# Patient Record
Sex: MALE | Race: BLACK OR AFRICAN AMERICAN | HISPANIC OR LATINO | Employment: FULL TIME | URBAN - METROPOLITAN AREA
[De-identification: names, ages, dates, MRNs, and addresses within clinical notes are randomized per-mention and may not be internally consistent; named-entity substitution may affect disease eponyms.]

---

## 2018-08-24 ENCOUNTER — HOSPITAL ENCOUNTER (EMERGENCY)
Facility: HOSPITAL | Age: 28
Discharge: HOME | End: 2018-08-24
Attending: EMERGENCY MEDICINE | Admitting: EMERGENCY MEDICINE
Payer: COMMERCIAL

## 2018-08-24 VITALS
RESPIRATION RATE: 17 BRPM | OXYGEN SATURATION: 98 % | TEMPERATURE: 97.6 F | SYSTOLIC BLOOD PRESSURE: 132 MMHG | HEART RATE: 71 BPM | WEIGHT: 202 LBS | DIASTOLIC BLOOD PRESSURE: 74 MMHG

## 2018-08-24 DIAGNOSIS — R00.2 HEART PALPITATIONS: Primary | ICD-10-CM

## 2018-08-24 PROCEDURE — 99284 EMERGENCY DEPT VISIT MOD MDM: CPT

## 2018-08-24 PROCEDURE — 93005 ELECTROCARDIOGRAM TRACING: CPT

## 2018-08-25 LAB
ATRIAL RATE: 65 BPM
P AXIS: 66 DEGREES
PR INTERVAL: 168 MS
QRS AXIS: 55 DEGREES
QRSD INTERVAL: 92 MS
QT INTERVAL: 368 MS
QTC INTERVAL: 382 MS
T WAVE AXIS: 49 DEGREES
VENTRICULAR RATE: 65 BPM

## 2018-08-25 PROCEDURE — 93010 ELECTROCARDIOGRAM REPORT: CPT | Performed by: INTERNAL MEDICINE

## 2018-08-25 NOTE — DISCHARGE INSTRUCTIONS
Palpitaciones cardíacas   LO QUE NECESITA SABER:   Las palpitaciones cardíacas son sensaciones que se perciben argelia aceleraciones, leann, latidos o aleteos del corazón  También podría sentir latidos adicionales, que chambers corazón no late por un corto periodo de tiempo o que se [employer] latidos  Puede percibir estas sensaciones en el pecho, la garganta o el mónica  Pueden presentarse cuando está sentado, de pie o acostado  Las palpitaciones cardíacas pueden causar temor; sin embargo, generalmente no se deben a un problema importante  INSTRUCCIONES SOBRE EL [facility]:   Llame al 911 o pídale a alguien más que llame en cualquiera de los siguientes casos:   · Usted tiene alguno de los siguientes signos de un ataque cardíaco:      ¨ Estornudos, presión, o dolor en chambers pecho que dura mas de 5 minutos o regresa  ¨ Malestar o dolor en chambers espalda, mónica, mandíbula, abdomen, o brazo     ¨ Dificultad para respirar    ¨ Náuseas o vómito    ¨ Siente un desvanecimiento o tiene sudores fríos especialmente en el pecho o dificultad para respirar  · Usted tiene alguno de los siguientes signos de derrame cerebral:      ¨ Adormecimiento o caída de un lado de chambers saira     ¨ Debilidad en un brazo o ricky pierna    ¨ Confusión o debilidad para hablar    ¨ Mareos o dolor de cheryl intenso, o pérdida de la visión  · Usted se desmaya o pierde el conocimiento  Regrese a la xavi de emergencias si:   · Nikolas palpitaciones ocurren con más frecuencia o son más intensas  Pregúntele a chambers Samule Rode vitaminas y minerales son adecuados para usted  · Usted tiene nueva inflamación en nikolas pies o tobillos o esta ha empeorado  · Usted tiene preguntas o inquietudes acerca de chambers condición o cuidado  Acuda a nikolas consultas de control con chambers médico según le indicaron  Es posible que tenga que programar ricky ramonita de seguimiento con un cardiólogo   Hal vez deba hacerse exámenes para determinar si sufre problemas cardíacos que le causan las palpitaciones  Anote nikolas preguntas para que se acuerde de hacerlas twyla nikolas visitas  Mantenga un registro:  Magalys cuándo nikolas palpitaciones comienzan y terminan, qué estaba usted haciendo cuando comenzaron y nikolas síntomas  Mantenga un registro de lo que usted comió o tomó twyla las horas antes de nikolas palpitaciones  Incluya todo lo que aparentemente le ayudó a que nikolas síntomas mejoraran argelia acostarse o contener chambers respiración  Carmencita [employer] ayudará a usted y a chambers médico a saber qué le provoca las palpitaciones  Lleve el registro con usted a nikolas citas de seguimiento  Ayude a evitar las palpitaciones cardíacas:   · Controle el estrés y la ansiedad  Encuentre formas de [place], argelia escuchar música, meditar o hacer yoga  El ejercicio también puede ayudar a disminuir el estrés y la ansiedad  Hable con [place] persona de confianza sobre el estrés o la ansiedad que padece  También puede hablar con un psicoterapeuta  · Duerma lo suficiente cada la noche  Pregunte a chambers médico cuánto debería dormir usted cada noche  · No tome bebidas con cafeína o alcohol   Edra Olszewski y el alcohol pueden hacer que nikolas palpitaciones empeoren  Los refrescos, el café, el té, el chocolate y las bebidas energizantes contienen cafeína  · No fume  La nicotina y otros químicos en los cigarrillos y cigarros podrían provocar daño a chambers [place] y a nikolas vasos sanguíneos  Pida información a chambers médico si usted actualmente fuma y necesita ayuda para dejar de fumar  Los cigarrillos electrónicos o tabaco sin humo todavía contienen nicotina  Consulte con chambers médico antes de [employer]  · No use drogas ilegales  Hable con chambers médico si consume drogas ilegales y Antonio Beltranashley  © 2017 [address] Information is for End User's use only and may not be sold, redistributed or otherwise used for commercial purposes   All illustrations and images included in CareNotes® are the copyrighted property of A  D A M , Berenice Hopper  Esta información es sólo para uso en educación  Chambers intención no es darle un consejo médico sobre enfermedades o tratamientos  Colsulte con chambers Radha Cables farmacéutico antes de seguir cualquier régimen médico para saber si es seguro y efectivo para usted

## 2018-08-25 NOTE — ED PROVIDER NOTES
History  Chief Complaint   Patient presents with    Palpitations     patient states was watching tv when all of the sudden he felt his heart "beating real strong and real fast so much it scare me"  Patient states symptoms have improved at this time     Patient is a 42-year-old male with no significant past medical history presents with acute onset of palpitations  Patient states that he was watching TV tonight just prior to arrival when he had episode of palpitations on the left side of his chest   Episode lasted only a couple minutes and self resolved  Patient denies any nausea vomiting with it but does admit some shortness of breath denies any smoking or stimulant use outside of 2-3 cans of [employer] day  Nothing makes it is worsened only time it better  Patient denies any chest pain with it as well  None       No past medical history on file  No past surgical history on file  No family history on file  I have reviewed and agree with the history as documented  Social History   Substance Use Topics    Smoking status: Never Smoker    Smokeless tobacco: Never Used    Alcohol use No        Review of Systems   Constitutional: Negative  HENT: Negative  Eyes: Negative  Respiratory: Positive for shortness of breath  Negative for wheezing  Cardiovascular: Positive for palpitations  Negative for chest pain and leg swelling  Gastrointestinal: Negative  Negative for abdominal pain  Endocrine: Negative  Genitourinary: Negative  Musculoskeletal: Negative  Skin: Negative  Allergic/Immunologic: Negative  Neurological: Negative  Hematological: Negative  Psychiatric/Behavioral: Negative  All other systems reviewed and are negative  Physical Exam  Physical Exam   Constitutional: He is oriented to person, place, and time  He appears well-developed and well-nourished  Eyes: Conjunctivae and EOM are normal  Pupils are equal, round, and reactive to light  Neck: Normal range of motion  Neck supple  Cardiovascular: Normal rate, regular rhythm, normal heart sounds and intact distal pulses  Pulmonary/Chest: Effort normal and breath sounds normal    Abdominal: Soft  Bowel sounds are normal    Musculoskeletal: Normal range of motion  Neurological: He is alert and oriented to person, place, and time  Skin: Skin is warm and dry  Capillary refill takes less than 2 seconds  Nursing note and vitals reviewed  Vital Signs  ED Triage Vitals [08/24/18 2201]   Temperature Pulse Respirations Blood Pressure SpO2   97 6 °F (36 4 °C) 71 17 132/74 98 %      Temp Source Heart Rate Source Patient Position - Orthostatic VS BP Location FiO2 (%)   Temporal Monitor Sitting Left arm --      Pain Score       No Pain           Vitals:    08/24/18 2201   BP: 132/74   Pulse: 71   Patient Position - Orthostatic VS: Sitting       Visual Acuity      ED Medications  Medications - No data to display    Diagnostic Studies  Results Reviewed     None                 No orders to display              Procedures  ECG 12 Lead Documentation  Date/Time: 8/24/2018 10:14 PM  Performed by: Shannan Sofia  Authorized by: Shannan Sofia     Indications / Diagnosis:  Palpitations  ECG reviewed by me, the ED Provider: yes    Patient location:  ED  Previous ECG:     Comparison to cardiac monitor: No    Interpretation:     Interpretation: normal    Rate:     ECG rate:  65    ECG rate assessment: normal    Rhythm:     Rhythm: sinus rhythm    Ectopy:     Ectopy: none    QRS:     QRS axis:  Normal    QRS intervals:  Normal  Conduction:     Conduction: normal    ST segments:     ST segments:  Normal  T waves:     T waves: normal             Phone Contacts  ED Phone Contact    ED Course                               MDM  Number of Diagnoses or Management Options  Diagnosis management comments: Patient is a 79-year-old male with brief episode of palpitation night currently resolved    No history of cocaine smoking or heavy caffeine intake  EKG is unremarkable and patient is asymptomatic at this time  Will discharge follow up with held family practice return to the ER for any concerns  Amount and/or Complexity of Data Reviewed  Tests in the medicine section of CPT®: ordered and reviewed      CritCare Time    Disposition  Final diagnoses:   Heart palpitations     Time reflects when diagnosis was documented in both MDM as applicable and the Disposition within this note     Time User Action Codes Description Comment    8/24/2018 10:22 PM Adolfo Brenda Add [R00 2] Heart palpitations       ED Disposition     ED Disposition Condition Comment    Discharge  Mayuri Matthews discharge to home/self care  Condition at discharge: Stable        Follow-up Information     Follow up With Specialties Details Why Contact Info    Isabel ZEPEDA  [address] [place] [address] [facility]  [phone]            Patient's Medications    No medications on file     No discharge procedures on file      ED Provider  Electronically Signed by           Ricci Epperson MD  08/24/18 2714

## 2019-06-01 ENCOUNTER — OFFICE VISIT (OUTPATIENT)
Dept: URGENT CARE | Age: 29
End: 2019-06-01
Payer: COMMERCIAL

## 2019-06-01 VITALS
DIASTOLIC BLOOD PRESSURE: 60 MMHG | WEIGHT: 200 LBS | OXYGEN SATURATION: 98 % | HEIGHT: 74 IN | BODY MASS INDEX: 25.67 KG/M2 | TEMPERATURE: 97.6 F | RESPIRATION RATE: 18 BRPM | SYSTOLIC BLOOD PRESSURE: 121 MMHG | HEART RATE: 80 BPM

## 2019-06-01 DIAGNOSIS — B00.59 HERPES SIMPLEX DERMATITIS OF EYELID: Primary | ICD-10-CM

## 2019-06-01 PROCEDURE — 99213 OFFICE O/P EST LOW 20 MIN: CPT | Performed by: PHYSICIAN ASSISTANT

## 2019-06-01 RX ORDER — TETRACAINE HYDROCHLORIDE 5 MG/ML
1 SOLUTION OPHTHALMIC ONCE
Status: COMPLETED | OUTPATIENT
Start: 2019-06-01 | End: 2019-06-01

## 2019-06-01 RX ORDER — VALACYCLOVIR HYDROCHLORIDE 1 G/1
1000 TABLET, FILM COATED ORAL 3 TIMES DAILY
Qty: 21 TABLET | Refills: 0 | Status: SHIPPED | OUTPATIENT
Start: 2019-06-01 | End: 2019-06-19

## 2019-06-01 RX ADMIN — TETRACAINE HYDROCHLORIDE 1 DROP: 5 SOLUTION OPHTHALMIC at 13:17

## 2019-06-02 ENCOUNTER — APPOINTMENT (EMERGENCY)
Dept: CT IMAGING | Facility: HOSPITAL | Age: 29
End: 2019-06-02
Payer: COMMERCIAL

## 2019-06-02 ENCOUNTER — HOSPITAL ENCOUNTER (EMERGENCY)
Facility: HOSPITAL | Age: 29
Discharge: HOME | End: 2019-06-02
Attending: EMERGENCY MEDICINE
Payer: COMMERCIAL

## 2019-06-02 VITALS
BODY MASS INDEX: 25.67 KG/M2 | RESPIRATION RATE: 18 BRPM | HEART RATE: 61 BPM | SYSTOLIC BLOOD PRESSURE: 109 MMHG | OXYGEN SATURATION: 98 % | WEIGHT: 199.96 LBS | DIASTOLIC BLOOD PRESSURE: 53 MMHG | TEMPERATURE: 99 F

## 2019-06-02 DIAGNOSIS — B00.50 HERPES SIMPLEX OPHTHALMICUS: ICD-10-CM

## 2019-06-02 DIAGNOSIS — L03.213 PRESEPTAL CELLULITIS OF RIGHT EYE: Primary | ICD-10-CM

## 2019-06-02 LAB
ANION GAP SERPL CALCULATED.3IONS-SCNC: 6 MMOL/L (ref 4–13)
APTT PPP: 26 SECONDS (ref 26–38)
BASOPHILS # BLD AUTO: 0.06 THOUSANDS/ΜL (ref 0–0.1)
BASOPHILS NFR BLD AUTO: 1 % (ref 0–1)
BUN SERPL-MCNC: 12 MG/DL (ref 5–25)
CALCIUM SERPL-MCNC: 9.4 MG/DL (ref 8.3–10.1)
CHLORIDE SERPL-SCNC: 105 MMOL/L (ref 100–108)
CO2 SERPL-SCNC: 28 MMOL/L (ref 21–32)
CREAT SERPL-MCNC: 0.96 MG/DL (ref 0.6–1.3)
EOSINOPHIL # BLD AUTO: 0.16 THOUSAND/ΜL (ref 0–0.61)
EOSINOPHIL NFR BLD AUTO: 2 % (ref 0–6)
ERYTHROCYTE [DISTWIDTH] IN BLOOD BY AUTOMATED COUNT: 13.5 % (ref 11.6–15.1)
GFR SERPL CREATININE-BSD FRML MDRD: 109 ML/MIN/1.73SQ M
GLUCOSE SERPL-MCNC: 95 MG/DL (ref 65–140)
HCT VFR BLD AUTO: 48.5 % (ref 36.5–49.3)
HGB BLD-MCNC: 15.9 G/DL (ref 12–17)
IMM GRANULOCYTES # BLD AUTO: 0.02 THOUSAND/UL (ref 0–0.2)
IMM GRANULOCYTES NFR BLD AUTO: 0 % (ref 0–2)
INR PPP: 0.94 (ref 0.86–1.17)
LACTATE SERPL-SCNC: 1 MMOL/L (ref 0.5–2)
LYMPHOCYTES # BLD AUTO: 1.97 THOUSANDS/ΜL (ref 0.6–4.47)
LYMPHOCYTES NFR BLD AUTO: 23 % (ref 14–44)
MCH RBC QN AUTO: 26.1 PG (ref 26.8–34.3)
MCHC RBC AUTO-ENTMCNC: 32.8 G/DL (ref 31.4–37.4)
MCV RBC AUTO: 80 FL (ref 82–98)
MONOCYTES # BLD AUTO: 0.9 THOUSAND/ΜL (ref 0.17–1.22)
MONOCYTES NFR BLD AUTO: 11 % (ref 4–12)
NEUTROPHILS # BLD AUTO: 5.37 THOUSANDS/ΜL (ref 1.85–7.62)
NEUTS SEG NFR BLD AUTO: 63 % (ref 43–75)
NRBC BLD AUTO-RTO: 0 /100 WBCS
PLATELET # BLD AUTO: 203 THOUSANDS/UL (ref 149–390)
PMV BLD AUTO: 9.8 FL (ref 8.9–12.7)
POTASSIUM SERPL-SCNC: 4 MMOL/L (ref 3.5–5.3)
PROCALCITONIN SERPL-MCNC: <0.05 NG/ML
PROTHROMBIN TIME: 12.7 SECONDS (ref 11.8–14.2)
RBC # BLD AUTO: 6.1 MILLION/UL (ref 3.88–5.62)
SODIUM SERPL-SCNC: 139 MMOL/L (ref 136–145)
WBC # BLD AUTO: 8.48 THOUSAND/UL (ref 4.31–10.16)

## 2019-06-02 PROCEDURE — 96365 THER/PROPH/DIAG IV INF INIT: CPT

## 2019-06-02 PROCEDURE — 99284 EMERGENCY DEPT VISIT MOD MDM: CPT

## 2019-06-02 PROCEDURE — 99284 EMERGENCY DEPT VISIT MOD MDM: CPT | Performed by: EMERGENCY MEDICINE

## 2019-06-02 PROCEDURE — 96375 TX/PRO/DX INJ NEW DRUG ADDON: CPT

## 2019-06-02 PROCEDURE — 70481 CT ORBIT/EAR/FOSSA W/DYE: CPT

## 2019-06-02 PROCEDURE — 83605 ASSAY OF LACTIC ACID: CPT | Performed by: EMERGENCY MEDICINE

## 2019-06-02 PROCEDURE — 80048 BASIC METABOLIC PNL TOTAL CA: CPT | Performed by: EMERGENCY MEDICINE

## 2019-06-02 PROCEDURE — 85730 THROMBOPLASTIN TIME PARTIAL: CPT | Performed by: EMERGENCY MEDICINE

## 2019-06-02 PROCEDURE — 87040 BLOOD CULTURE FOR BACTERIA: CPT | Performed by: EMERGENCY MEDICINE

## 2019-06-02 PROCEDURE — 84145 PROCALCITONIN (PCT): CPT | Performed by: EMERGENCY MEDICINE

## 2019-06-02 PROCEDURE — 36415 COLL VENOUS BLD VENIPUNCTURE: CPT | Performed by: EMERGENCY MEDICINE

## 2019-06-02 PROCEDURE — 85610 PROTHROMBIN TIME: CPT | Performed by: EMERGENCY MEDICINE

## 2019-06-02 PROCEDURE — 85025 COMPLETE CBC W/AUTO DIFF WBC: CPT | Performed by: EMERGENCY MEDICINE

## 2019-06-02 RX ORDER — TRIFLURIDINE 10 MG/ML
1 SOLUTION OPHTHALMIC
Qty: 7.5 ML | Refills: 0 | Status: SHIPPED | OUTPATIENT
Start: 2019-06-02

## 2019-06-02 RX ORDER — CEPHALEXIN 500 MG/1
500 CAPSULE ORAL 4 TIMES DAILY
Qty: 28 CAPSULE | Refills: 0 | Status: SHIPPED | OUTPATIENT
Start: 2019-06-02 | End: 2019-06-09

## 2019-06-02 RX ORDER — OXYCODONE HYDROCHLORIDE AND ACETAMINOPHEN 5; 325 MG/1; MG/1
1 TABLET ORAL EVERY 6 HOURS PRN
Qty: 8 TABLET | Refills: 0 | Status: SHIPPED | OUTPATIENT
Start: 2019-06-02 | End: 2019-06-10

## 2019-06-02 RX ORDER — CEFAZOLIN SODIUM 2 G/50ML
2000 SOLUTION INTRAVENOUS ONCE
Status: COMPLETED | OUTPATIENT
Start: 2019-06-02 | End: 2019-06-02

## 2019-06-02 RX ORDER — IBUPROFEN 600 MG/1
600 TABLET ORAL EVERY 6 HOURS PRN
Qty: 30 TABLET | Refills: 0 | Status: SHIPPED | OUTPATIENT
Start: 2019-06-02 | End: 2019-06-19

## 2019-06-02 RX ORDER — KETOROLAC TROMETHAMINE 30 MG/ML
15 INJECTION, SOLUTION INTRAMUSCULAR; INTRAVENOUS ONCE
Status: COMPLETED | OUTPATIENT
Start: 2019-06-02 | End: 2019-06-02

## 2019-06-02 RX ORDER — SULFAMETHOXAZOLE AND TRIMETHOPRIM 800; 160 MG/1; MG/1
1 TABLET ORAL 2 TIMES DAILY
Qty: 14 TABLET | Refills: 0 | Status: SHIPPED | OUTPATIENT
Start: 2019-06-02 | End: 2019-06-09

## 2019-06-02 RX ADMIN — CEFAZOLIN SODIUM 2000 MG: 2 SOLUTION INTRAVENOUS at 10:07

## 2019-06-02 RX ADMIN — IOHEXOL 100 ML: 350 INJECTION, SOLUTION INTRAVENOUS at 09:17

## 2019-06-02 RX ADMIN — KETOROLAC TROMETHAMINE 15 MG: 30 INJECTION, SOLUTION INTRAMUSCULAR; INTRAVENOUS at 08:49

## 2019-06-07 LAB
BACTERIA BLD CULT: NORMAL
BACTERIA BLD CULT: NORMAL

## 2019-06-19 ENCOUNTER — APPOINTMENT (EMERGENCY)
Dept: RADIOLOGY | Facility: HOSPITAL | Age: 29
End: 2019-06-19
Payer: COMMERCIAL

## 2019-06-19 ENCOUNTER — HOSPITAL ENCOUNTER (EMERGENCY)
Facility: HOSPITAL | Age: 29
Discharge: HOME | End: 2019-06-19
Attending: EMERGENCY MEDICINE | Admitting: EMERGENCY MEDICINE
Payer: COMMERCIAL

## 2019-06-19 VITALS
OXYGEN SATURATION: 99 % | TEMPERATURE: 98.1 F | WEIGHT: 204.59 LBS | SYSTOLIC BLOOD PRESSURE: 121 MMHG | BODY MASS INDEX: 26.27 KG/M2 | DIASTOLIC BLOOD PRESSURE: 72 MMHG | RESPIRATION RATE: 18 BRPM | HEART RATE: 78 BPM
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
## 2019-06-19 DIAGNOSIS — S60.811A ABRASION OF RIGHT WRIST, INITIAL ENCOUNTER: ICD-10-CM

## 2019-06-19 PROCEDURE — 73110 X-RAY EXAM OF WRIST: CPT

## 2019-06-19 PROCEDURE — 90715 TDAP VACCINE 7 YRS/> IM: CPT | Performed by: EMERGENCY MEDICINE

## 2019-06-19 PROCEDURE — 90471 IMMUNIZATION ADMIN: CPT

## 2019-06-19 PROCEDURE — 99284 EMERGENCY DEPT VISIT MOD MDM: CPT

## 2019-06-19 PROCEDURE — 99284 EMERGENCY DEPT VISIT MOD MDM: CPT | Performed by: EMERGENCY MEDICINE

## 2019-06-19 RX ORDER — ACETAMINOPHEN 325 MG/1
650 TABLET ORAL ONCE
Status: COMPLETED | OUTPATIENT
Start: 2019-06-19 | End: 2019-06-19

## 2019-06-19 RX ORDER — IBUPROFEN 600 MG/1
600 TABLET ORAL ONCE
Status: COMPLETED | OUTPATIENT
Start: 2019-06-19 | End: 2019-06-19

## 2019-06-19 RX ORDER — GINSENG 100 MG
1 CAPSULE ORAL ONCE
Status: COMPLETED | OUTPATIENT
Start: 2019-06-19 | End: 2019-06-19

## 2019-06-19 RX ADMIN — TETANUS TOXOID, REDUCED DIPHTHERIA TOXOID AND ACELLULAR PERTUSSIS VACCINE, ADSORBED 0.5 ML: 5; 2.5; 8; 8; 2.5 SUSPENSION INTRAMUSCULAR at 09:43

## 2019-06-19 RX ADMIN — ACETAMINOPHEN 650 MG: 325 TABLET, FILM COATED ORAL at 09:42

## 2019-06-19 RX ADMIN — IBUPROFEN 600 MG: 600 TABLET ORAL at 09:42

## 2019-06-19 RX ADMIN — BACITRACIN ZINC 1 SMALL APPLICATION: 500 OINTMENT TOPICAL at 10:03
